# Patient Record
Sex: MALE | Race: WHITE | NOT HISPANIC OR LATINO | URBAN - NONMETROPOLITAN AREA
[De-identification: names, ages, dates, MRNs, and addresses within clinical notes are randomized per-mention and may not be internally consistent; named-entity substitution may affect disease eponyms.]

---

## 2020-06-04 NOTE — PATIENT DISCUSSION
Discussed increased risk of floaters, myopic degeneration and retinal detachment associated with high myopia.  No RT/RD.

## 2020-06-04 NOTE — PATIENT DISCUSSION
No RT/RD, We reviewed the signs and symptoms of retinal tear/retinal detachment and the importance of prompt evaluation should there be increasing floaters, new flashing lights, or decreasing peripheral vision in either eye at any time.

## 2021-10-27 ENCOUNTER — IMPORTED ENCOUNTER (OUTPATIENT)
Dept: URBAN - NONMETROPOLITAN AREA CLINIC 1 | Facility: CLINIC | Age: 56
End: 2021-10-27

## 2021-10-27 PROBLEM — H25.041: Noted: 2021-10-27

## 2021-10-27 PROBLEM — H25.13: Noted: 2021-10-27

## 2021-10-27 PROBLEM — E11.9: Noted: 2021-10-27

## 2021-10-27 PROCEDURE — 92004 COMPRE OPH EXAM NEW PT 1/>: CPT

## 2021-10-27 PROCEDURE — 92015 DETERMINE REFRACTIVE STATE: CPT

## 2021-10-27 NOTE — PATIENT DISCUSSION
Cataract(s)-Visually significant.-Cataract(s) causing symptomatic impairment of visual function not correctable with a tolerable change in glasses or contact lenses lighting or non-operative means resulting in specific activity limitations and/or participation restrictions including but not limited to reading viewing television driving or meeting vocational or recreational needs. -Expectation is clearer vision and reduced glare disability after cataract removal.-Refer to Dr Janet Mckeon for cataract evaluationDM s DR-Stressed the importance of keeping blood sugars under control blood pressure under control and weight normalization and regular visits with PCP. -Explained the possible effects of poorly controlled diabetes and the damage that diabetes can cause to ocular health. -Patient to check HgbA1C.-Pt instructed to contact our office with any vision changes.

## 2021-11-08 ENCOUNTER — IMPORTED ENCOUNTER (OUTPATIENT)
Dept: URBAN - NONMETROPOLITAN AREA CLINIC 1 | Facility: CLINIC | Age: 56
End: 2021-11-08

## 2021-11-08 PROBLEM — H25.13: Noted: 2021-11-08

## 2021-11-08 PROBLEM — H25.041: Noted: 2021-11-08

## 2021-11-08 PROBLEM — E11.9: Noted: 2021-10-27

## 2021-11-08 PROCEDURE — 99214 OFFICE O/P EST MOD 30 MIN: CPT

## 2021-11-08 NOTE — PATIENT DISCUSSION
Cataract(s)-Visually significant cataract OD . -Cataract(s) causing symptomatic impairment of visual function not correctable with a tolerable change in glasses or contact lenses lighting or non-operative means resulting in specific activity limitations and/or participation restrictions including but not limited to reading viewing television driving or meeting vocational or recreational needs. -Expectation is clearer vision and functional improvement in symptoms as well as reduced glare disability after cataract removal.-Order IOLMaster and OPD today. -Recommend Lensx VivityToric IOL   based on today's OPD testing and lifestyle questionnaire.-All questions were answered regarding surgery including pre and post-op medications appointments activity restrictions and anesthetic usage.-The risks benefits and alternatives and special risk factors for the patient were discussed in detail including but not limited to: bleeding infection retinal detachment vitreous loss problems with the implant and possible need for additional surgery.-Although rare the possibility of complete vision loss was discussed.-The possible need for glasses post-operatively was discussed.-Order medical clearance exam based on history of -Patient elects to proceed with cataract surgery OD .   Lensx Vivity Toric IOL OD

## 2022-01-07 PROBLEM — H25.041: Noted: 2022-01-07

## 2022-01-07 PROBLEM — H25.13: Noted: 2022-01-07

## 2022-01-07 PROBLEM — E11.9: Noted: 2022-01-07

## 2022-01-10 ENCOUNTER — IMPORTED ENCOUNTER (OUTPATIENT)
Dept: URBAN - NONMETROPOLITAN AREA CLINIC 1 | Facility: CLINIC | Age: 57
End: 2022-01-10

## 2022-01-10 PROBLEM — Z01.818: Noted: 2022-01-10

## 2022-01-10 PROBLEM — E11.9: Noted: 2022-01-10

## 2022-01-10 PROBLEM — I10: Noted: 2022-01-10

## 2022-01-10 PROBLEM — E78.5: Noted: 2022-01-10

## 2022-01-10 NOTE — PATIENT DISCUSSION
Medical Clearance-Medical clearance done today. -No outstanding concerns that would preclude surgery.-Patient is cleared to proceed with scheduled surgery. Discussed difference between standard lens and Vivity that he chose. Wants to proceed with Vivity.

## 2022-01-26 ENCOUNTER — POST-OP (OUTPATIENT)
Dept: RURAL CLINIC 1 | Facility: CLINIC | Age: 57
End: 2022-01-26

## 2022-01-26 DIAGNOSIS — Z96.1: ICD-10-CM

## 2022-01-26 PROCEDURE — 99024 POSTOP FOLLOW-UP VISIT: CPT

## 2022-01-26 ASSESSMENT — VISUAL ACUITY: OD_SC: 20/20

## 2022-01-26 ASSESSMENT — TONOMETRY: OD_IOP_MMHG: 15

## 2022-01-26 NOTE — PATIENT DISCUSSION
Continue with post-operative drops until completed. Discussed warning symptoms of RD/infection with pt who understands to call/seek urgent evaluation if they occur. Patient to wear protective shield over operative eye when sleeping x 1 week. Recheck in 1 week.

## 2022-01-31 ENCOUNTER — POST OP/EVAL OF SECOND EYE (OUTPATIENT)
Dept: RURAL CLINIC 1 | Facility: CLINIC | Age: 57
End: 2022-01-31

## 2022-01-31 VITALS
DIASTOLIC BLOOD PRESSURE: 91 MMHG | BODY MASS INDEX: 31.07 KG/M2 | WEIGHT: 205 LBS | HEART RATE: 64 BPM | SYSTOLIC BLOOD PRESSURE: 130 MMHG | HEIGHT: 68 IN

## 2022-01-31 DIAGNOSIS — Z96.1: ICD-10-CM

## 2022-01-31 DIAGNOSIS — H25.12: ICD-10-CM

## 2022-01-31 PROCEDURE — 99024 POSTOP FOLLOW-UP VISIT: CPT

## 2022-01-31 ASSESSMENT — VISUAL ACUITY
OS_BAT: 20/50
OD_CC: 20/20
OS_AM: 20/25
OD_CC: 20/70

## 2022-01-31 ASSESSMENT — TONOMETRY
OD_IOP_MMHG: 14
OS_IOP_MMHG: 13

## 2022-02-21 ENCOUNTER — POST OP/EVAL OF SECOND EYE (OUTPATIENT)
Dept: RURAL CLINIC 1 | Facility: CLINIC | Age: 57
End: 2022-02-21

## 2022-02-21 DIAGNOSIS — Z96.1: ICD-10-CM

## 2022-02-21 DIAGNOSIS — H25.12: ICD-10-CM

## 2022-02-21 PROCEDURE — 99024 POSTOP FOLLOW-UP VISIT: CPT

## 2022-02-21 ASSESSMENT — TONOMETRY
OS_IOP_MMHG: 14
OD_IOP_MMHG: 14

## 2022-02-21 ASSESSMENT — VISUAL ACUITY
OD_SC: 20/30
OD_SC: 20/20-1

## 2022-04-09 ASSESSMENT — VISUAL ACUITY
OS_CC: J1
OS_PH: 20/30
OS_PH: 20/30
OD_PH: 20/100
OS_SC: 20/20
OD_PAM: 20/100
OD_GLARE: CF4'
OD_SC: 20/200
OS_GLARE: 20/20
OS_CC: 20/200+1
OD_SC: 20/200
OS_GLARE: 20/20
OS_AM: 20/30
OD_GLARE: CF4'
OD_PH: 20/100
OS_SC: 20/20
OS_CC: 20/30-1
OD_CC: J1
OD_PAM: 20/100
OS_AM: 20/30
OD_CC: 20/200

## 2022-04-09 ASSESSMENT — TONOMETRY
OD_IOP_MMHG: 14
OS_IOP_MMHG: 14

## 2022-11-07 ENCOUNTER — COMPREHENSIVE EXAM (OUTPATIENT)
Dept: RURAL CLINIC 1 | Facility: CLINIC | Age: 57
End: 2022-11-07

## 2022-11-07 DIAGNOSIS — E11.9: ICD-10-CM

## 2022-11-07 DIAGNOSIS — H25.12: ICD-10-CM

## 2022-11-07 PROCEDURE — 92012 INTRM OPH EXAM EST PATIENT: CPT

## 2022-11-07 ASSESSMENT — VISUAL ACUITY
OS_SC: 20/25
OD_SC: 20/20-2
OU_SC: 20/20

## 2022-11-07 ASSESSMENT — TONOMETRY: OD_IOP_MMHG: 13

## 2022-11-07 NOTE — PATIENT DISCUSSION
Cataract(s) causing symptomatic impairment of visual function not correctable with a tolerable change in glasses or contact lenses, lighting, or non-operative means resulting in activity limitations and/or participation restrictions including, but not limited to reading, viewing television, driving, or meeting vocational or recreational needs. Expectation is clearer vision and functional improvement in symptoms as well as reduced glare disability after cataract removal. The risks, benefits, alternatives, and special risk factors for the patient were discussed in detail including but not limited to bleeding, infection, retinal detachment, vitreous loss, problems with the implant, and possible need for additional surgery. Although rare, the possibility of complete visual loss was discussed. All questions were answered regarding surgery including pre and post-op medications, appointments, activity restrictions, and anesthetic usage.

## 2023-05-15 ENCOUNTER — ESTABLISHED PATIENT (OUTPATIENT)
Dept: RURAL CLINIC 1 | Facility: CLINIC | Age: 58
End: 2023-05-15

## 2023-05-15 DIAGNOSIS — H25.12: ICD-10-CM

## 2023-05-15 DIAGNOSIS — H26.491: ICD-10-CM

## 2023-05-15 DIAGNOSIS — E11.3291: ICD-10-CM

## 2023-05-15 PROCEDURE — 92014 COMPRE OPH EXAM EST PT 1/>: CPT

## 2023-05-15 ASSESSMENT — VISUAL ACUITY
OD_SC: 20/20
OS_BAT: 20/40
OS_SC: 20/20
OD_BAT: 20/20

## 2023-05-15 ASSESSMENT — TONOMETRY
OD_IOP_MMHG: 16
OS_IOP_MMHG: 16